# Patient Record
Sex: FEMALE
[De-identification: names, ages, dates, MRNs, and addresses within clinical notes are randomized per-mention and may not be internally consistent; named-entity substitution may affect disease eponyms.]

---

## 2017-10-25 ENCOUNTER — CHARTING TRANS (OUTPATIENT)
Dept: OTHER | Age: 27
End: 2017-10-25

## 2018-11-02 VITALS — TEMPERATURE: 98.2 F

## 2024-03-10 ENCOUNTER — HOSPITAL ENCOUNTER (OUTPATIENT)
Age: 34
Discharge: HOME | End: 2024-03-10
Payer: COMMERCIAL

## 2024-03-10 VITALS
DIASTOLIC BLOOD PRESSURE: 78 MMHG | OXYGEN SATURATION: 99 % | HEART RATE: 98 BPM | BODY MASS INDEX: 25.77 KG/M2 | RESPIRATION RATE: 16 BRPM | WEIGHT: 180 LBS | HEIGHT: 70 IN | TEMPERATURE: 98 F | SYSTOLIC BLOOD PRESSURE: 114 MMHG

## 2024-03-10 DIAGNOSIS — J02.0 STREP PHARYNGITIS: Primary | ICD-10-CM

## 2024-03-10 LAB — S PYO AG THROAT QL: POSITIVE

## 2024-03-10 RX ORDER — AMOXICILLIN 500 MG/1
500 TABLET, FILM COATED ORAL 2 TIMES DAILY
Qty: 20 TABLET | Refills: 0 | Status: SHIPPED | OUTPATIENT
Start: 2024-03-10 | End: 2024-03-20

## 2024-03-10 NOTE — ED INITIAL ASSESSMENT (HPI)
Pt presents to the IC with c/o a sore throat, mainly right sided. +chills and hot flashes but no fevers.

## 2024-03-10 NOTE — ED PROVIDER NOTES
Patient Seen in: [facility]      History     Chief Complaint   Patient presents with    Sore Throat     Body aches and hot and cold flashes. Negative at home COVID test. - Entered by patient     Stated Complaint: Sore Throat - Body aches and hot and cold flashes. Negative at home COVID test.    Subjective:   HPI    33 yr old female here for sore throat, chills, sweats since last night. Denies abd pain, vomiting, diarrhea, ear pain, sob, chest pain, dizziness, headaches. COVID at home was negative. She is breastfeeding her 4 month old. Reports other child with viral symptoms at home.        Objective:   History reviewed. No pertinent past medical history.           History reviewed. No pertinent surgical history.             Social History     Socioeconomic History    Marital status:    Tobacco Use    Smoking status: Never    Smokeless tobacco: Never              Review of Systems    Positive for stated complaint: Sore Throat - Body aches and hot and cold flashes. Negative at home COVID test.  Other systems are as noted in HPI.  Constitutional and vital signs reviewed.      All other systems reviewed and negative except as noted above.    Physical Exam     ED Triage Vitals [03/10/24 1106]   /78   Pulse 98   Resp 16   Temp 98.1 °F (36.7 °C)   Temp src Temporal   SpO2 99 %   O2 Device None (Room air)       Current:/78   Pulse 98   Temp 98.1 °F (36.7 °C) (Temporal)   Resp 16   Ht 177.8 cm (5' 10\")   Wt 81.6 kg   SpO2 99%   Breastfeeding Yes   BMI 25.83 kg/m²         Physical Exam  Vitals and nursing note reviewed.   Constitutional:       General: She is not in acute distress.     Appearance: She is well-developed. She is not ill-appearing or toxic-appearing.   HENT:      Right Ear: Tympanic membrane and ear canal normal.      Left Ear: Tympanic membrane and ear canal normal.      Nose: No congestion or rhinorrhea.      Mouth/Throat:      Mouth: Mucous membranes are moist. No  oral lesions.      Pharynx: Oropharynx is clear. Uvula midline. Posterior oropharyngeal erythema present. No pharyngeal swelling, oropharyngeal exudate or uvula swelling.      Tonsils: No tonsillar exudate or tonsillar abscesses.   Eyes:      Pupils: Pupils are equal, round, and reactive to light.   Cardiovascular:      Rate and Rhythm: Normal rate.   Pulmonary:      Effort: Pulmonary effort is normal. No respiratory distress.      Breath sounds: Normal breath sounds. No stridor. No wheezing, rhonchi or rales.   Musculoskeletal:      Cervical back: Normal range of motion and neck supple.   Lymphadenopathy:      Cervical: No cervical adenopathy.   Skin:     General: Skin is warm.   Neurological:      Mental Status: She is alert and oriented to person, place, and time.   Psychiatric:         Mood and Affect: Mood normal.         Behavior: Behavior normal.               ED Course     Labs Reviewed   POCT RAPID STREP - Abnormal; Notable for the following components:       Result Value    POCT Rapid Strep Positive (*)     All other components within normal limits          ED Course as of 03/10/24 1144  ------------------------------------------------------------  Time: 03/10 1117  Value: POCT Rapid Strep(!)  Comment: (Reviewed)              MDM     33-year-old female here for evaluation of sore throat, chills and sweats since last night    On exam patient well-appearing, lungs are clear with no wheezing stridor or crackles, bilateral TM normal, pharynx with erythema, no swelling or exudate.  Uvula is midline with no PTA.  Tongue is moist.    Differential diagnosis includes strep versus COVID versus other viral syndrome    Strep is positive  Discussed amoxicillin x 10 days.  Patient is breast-feeding, amoxicillin is safe in lactation and pregnancy.  This is the commonly used medication for mastitis.  Discussed side effects including rash, diarrhea and vomiting in baby who is breast-feeding.  These side effects may or may  not happen.  Discussed max milk concentration at 5 hours.    Patient agrees to plan of care she is stable nontoxic for discharge home  All questions answered. Return and ER precautions given.    Counseled: Patient, regarding diagnosis, regarding treatment plan, regarding diagnostic results, regarding prescription, I have discussed with the patient the results of tests, differential diagnosis, and warning signs and symptoms that should prompt immediate return. The patient understands these instructions and agrees to the follow-up plan provided. There is no barriers to learning. Appropriate f/u given. Patient agrees to return for any concerns/ problems/complications.                                     Medical Decision Making      Disposition and Plan     Clinical Impression:  1. Strep pharyngitis         Disposition:  Discharge  3/10/2024 11:29 am    Follow-up:  No follow-up provider specified.        Medications Prescribed:  Discharge Medication List as of 3/10/2024 11:30 AM        START taking these medications    Details   amoxicillin 500 MG Oral Tab Take 1 tablet (500 mg total) by mouth 2 (two) times daily for 10 days., Normal, Disp-20 tablet, R-0

## 2024-03-10 NOTE — DISCHARGE INSTRUCTIONS
Increase water intake, drink water, gatorade, and stick with a soft and liquid diet until throat is feeling better.    Take tylenol or motrin for pain every 6 to 8 hours if needed.    Take antibiotics prescribed, finish full course! Amoxicillin is safe in lactation. This is what is used if you develop mastitis. Side effects of this for baby can possibly be a rash, diarrhea or vomiting. The max milk concentration is around 5 hours after ingesting the medication.    Change toothbrush in 48 hours.  Avoid sharing utensils, cups, foods with others. Avoid kissing.    RETURN OR GO TO ED for fever > 103 despite medication, difficulty swallowing saliva, shortness of breath, worsening swelling to throat that you cannot tolerate fluids.

## 2024-03-29 ENCOUNTER — HOSPITAL ENCOUNTER (OUTPATIENT)
Age: 34
Discharge: HOME | End: 2024-03-29
Payer: COMMERCIAL

## 2024-03-29 VITALS
RESPIRATION RATE: 18 BRPM | DIASTOLIC BLOOD PRESSURE: 69 MMHG | SYSTOLIC BLOOD PRESSURE: 110 MMHG | HEART RATE: 91 BPM | TEMPERATURE: 98 F | OXYGEN SATURATION: 98 %

## 2024-03-29 DIAGNOSIS — J02.0 STREPTOCOCCAL SORE THROAT: Primary | ICD-10-CM

## 2024-03-29 LAB — S PYO AG THROAT QL: POSITIVE

## 2024-03-29 PROCEDURE — 99213 OFFICE O/P EST LOW 20 MIN: CPT

## 2024-03-29 PROCEDURE — 87880 STREP A ASSAY W/OPTIC: CPT

## 2024-03-29 RX ORDER — AMOXICILLIN AND CLAVULANATE POTASSIUM 875; 125 MG/1; MG/1
1 TABLET, FILM COATED ORAL 2 TIMES DAILY
Qty: 20 TABLET | Refills: 0 | Status: SHIPPED | OUTPATIENT
Start: 2024-03-29 | End: 2024-04-08

## 2024-03-29 NOTE — ED PROVIDER NOTES
Patient Seen in: [facility]      History     Chief Complaint   Patient presents with    Sore Throat     Entered by patient     Stated Complaint: Sore Throat    Subjective:   Jelena is a 33-year-old female presenting to the immediate care complaining of a sore throat.  Patient states that her sore throat started last night and was accompanied by some bodyaches, chills and general unwell feeling.  Patient states that she was treated for strep earlier in the month and took all 10 days worth of the antibiotics was feeling much better until last night.  Patient does state that her daughter had strep at the same time as her.  Also states that her  tested positive for strep but was not treated around that same time.  She is unsure of any other known exposures.  Patient has not had a measured fever.  She denies any chest pain, shortness of breath, dizziness, weakness, abdominal pain or vomiting.  She has been eating and drinking well and is well-hydrated.  She states that she has some pain with swallowing, no difficulty swallowing or voice changes.  She denies any other concerns or complaints.          Objective:   History reviewed. No pertinent past medical history.           History reviewed. No pertinent surgical history.             Social History     Socioeconomic History    Marital status:    Tobacco Use    Smoking status: Never     Passive exposure: Never    Smokeless tobacco: Never   Vaping Use    Vaping Use: Never used   Substance and Sexual Activity    Alcohol use: Never    Drug use: Never              Review of Systems    Positive for stated complaint: Sore Throat  Other systems are as noted in HPI.  Constitutional and vital signs reviewed.      All other systems reviewed and negative except as noted above.    Physical Exam     ED Triage Vitals [03/29/24 1207]   /69   Pulse 91   Resp 18   Temp 97.5 °F (36.4 °C)   Temp src Temporal   SpO2 98 %   O2 Device None (Room air)        Current:/69   Pulse 91   Temp 97.5 °F (36.4 °C) (Temporal)   Resp 18   SpO2 98%   Breastfeeding Yes         Physical Exam  Vitals and nursing note reviewed.   Constitutional:       General: She is not in acute distress.     Appearance: Normal appearance. She is not ill-appearing, toxic-appearing or diaphoretic.   HENT:      Head: Normocephalic.      Right Ear: Tympanic membrane, ear canal and external ear normal.      Left Ear: Tympanic membrane, ear canal and external ear normal.      Nose: Nose normal.      Mouth/Throat:      Mouth: Mucous membranes are moist.      Pharynx: Oropharynx is clear. Uvula midline. Posterior oropharyngeal erythema present. No pharyngeal swelling, oropharyngeal exudate or uvula swelling.      Tonsils: Tonsillar exudate present. No tonsillar abscesses. 1+ on the right. 1+ on the left.      Comments: No trismus  Eyes:      Conjunctiva/sclera: Conjunctivae normal.   Cardiovascular:      Rate and Rhythm: Normal rate and regular rhythm.      Pulses: Normal pulses.      Heart sounds: Normal heart sounds.   Pulmonary:      Effort: Pulmonary effort is normal. No respiratory distress.      Breath sounds: Normal breath sounds. No stridor. No wheezing, rhonchi or rales.   Abdominal:      General: Abdomen is flat.   Musculoskeletal:         General: Normal range of motion.      Cervical back: Normal range of motion.   Skin:     General: Skin is warm.      Capillary Refill: Capillary refill takes less than 2 seconds.   Neurological:      General: No focal deficit present.      Mental Status: She is alert and oriented to person, place, and time.   Psychiatric:         Mood and Affect: Mood normal.         Behavior: Behavior normal.         Thought Content: Thought content normal.         Judgment: Judgment normal.               ED Course     Labs Reviewed   POCT RAPID STREP - Abnormal; Notable for the following components:       Result Value    POCT Rapid Strep Positive (*)     All  other components within normal limits              MDM               Medical Decision Making  Multiple medical diagnoses were considered including but not limited to viral versus bacterial etiology of sore throat, less likely peritonsillar abscess.  Patient is well appearing, non-toxic and in no acute distress.  Vital signs are stable.   Strep test was positive.  No unilateral tonsil swelling, no trismus.  Sent a prescription for Augmentin for the strep given recent treatment with amoxicillin earlier in the month.  Recommended that the patient use Tylenol or Motrin for fever or pain, drink plenty of fluids.  Recommended that if the patient develops any difficulty swallowing, voice changes, chest pain, shortness of breath or any other concerning complaints they should go to the emergency department.  Otherwise recommended follow up with PCP.  Recommended replacing the patient's toothbrush after the first 72 hours of antibiotics.  Also recommended the patient not return to school/work until 24 hours of antibiotics are complete.  ED precautions discussed.  Patient advised to follow up with PCP in 2-3 days.  Patient agrees with this plan of care.  Patient verbalizes understanding of discharge instructions and plan of care.      Amount and/or Complexity of Data Reviewed  Labs: ordered. Decision-making details documented in ED Course.    Risk  OTC drugs.  Prescription drug management.        Disposition and Plan     Clinical Impression:  1. Streptococcal sore throat         Disposition:  Discharge  3/29/2024 12:31 pm    Follow-up:  Aleyda Wilkerson MD  [address] [place] [id number]  [phone]                Medications Prescribed:  Current Discharge Medication List        START taking these medications    Details   amoxicillin clavulanate 875-125 MG Oral Tab Take 1 tablet by mouth 2 (two) times daily for 10 days.  Qty: 20 tablet, Refills: 0

## 2024-03-29 NOTE — ED INITIAL ASSESSMENT (HPI)
Patient was here on 3/10 and was diagnosed with strep did the entire 10 day course of abx and then since last night she felt feverish, sore throat, body aches. Patient states that her  was here and tested positive but was never given a rx for strep but because was asymptomatic. Patient states if her  was never treated then she may have been reinfected by him, or her daughter that also had strep.

## 2024-03-29 NOTE — DISCHARGE INSTRUCTIONS
Strep test was positive.  Please take the antibiotics as prescribed.  Use tylenol or motrin for fever or pain, drink plenty of fluids.  If the patient develops any difficulty swallowing, voice changes, chest pain, shortness of breath or any other concerning complaints they should go to the emergency department.  Otherwise follow up with your primary care doctor.  You should replace the patient's  toothbrush after the first 72 hours of antibiotics.  Also do not return to school/work until 24 hours of antibiotics are complete.

## 2024-08-29 ENCOUNTER — HOSPITAL ENCOUNTER (OUTPATIENT)
Age: 34
Discharge: HOME | End: 2024-08-29
Payer: COMMERCIAL

## 2024-08-29 VITALS
HEART RATE: 71 BPM | SYSTOLIC BLOOD PRESSURE: 97 MMHG | DIASTOLIC BLOOD PRESSURE: 71 MMHG | RESPIRATION RATE: 20 BRPM | OXYGEN SATURATION: 100 % | TEMPERATURE: 97 F

## 2024-08-29 DIAGNOSIS — J02.9 VIRAL PHARYNGITIS: Primary | ICD-10-CM

## 2024-08-29 LAB — S PYO AG THROAT QL: NEGATIVE

## 2024-08-29 PROCEDURE — 87880 STREP A ASSAY W/OPTIC: CPT | Performed by: NURSE PRACTITIONER

## 2024-08-29 PROCEDURE — 99213 OFFICE O/P EST LOW 20 MIN: CPT | Performed by: NURSE PRACTITIONER

## 2024-08-29 NOTE — ED PROVIDER NOTES
Chief Complaint   Patient presents with    Sore Throat       HPI:     Jelena Castro is a 33 year old female who presents for evaluation and management of a chief complaint of sore throat and fatigue ongoing for 6 days.  No fever.  No nasal congestion or cough.  No chest pain or shortness of breath.  No nausea, vomiting, diarrhea, or abdominal pain.    PFS  PFS asessment screens reviewed and agree.  Nursing note reviewed and I agree with documentation.    No family history on file.  Family history reviewed with patient/caregiver and is not pertinent to presenting problem.  Social History     Socioeconomic History    Marital status:      Spouse name: Not on file    Number of children: Not on file    Years of education: Not on file    Highest education level: Not on file   Occupational History    Not on file   Tobacco Use    Smoking status: Never     Passive exposure: Never    Smokeless tobacco: Never   Vaping Use    Vaping status: Never Used   Substance and Sexual Activity    Alcohol use: Never    Drug use: Never    Sexual activity: Not on file   Other Topics Concern    Not on file   Social History Narrative    Not on file     Social Determinants of Health     Financial Resource Strain: Not on file   Food Insecurity: No Food Insecurity (11/16/2023)    [facility], [facility]    Food Insecurity     Currently or in the past 3 months, have you worried your food would run out before you had money to buy more?: No     In the past 12 months, have you run out of food or been unable to get more?: No   Transportation Needs: No Transportation Needs (11/16/2023)    [facility], [facility]    Transportation Needs     Currently or in the past 3 months, has lack of transportation kept you from medical appointments, getting food or medicine, or providing care to a family member?: Not on file     : Not on file      Medical Transportation Needs?: No     Daily Living Transportation Needs? [Peds Only] : Not on file   Physical Activity: Not on file   Stress: Not on file   Social Connections: Unknown (3/12/2021)    [facility]    Social Connections     Conversations with friends/family/neighbors per week: Not on file   Housing Stability: Low Risk  (7/17/2021)    [facility]    Housing Stability     Mortgage Payment Concerns?: Not on file     Number of Places Lived in the Last Year: Not on file     Unstable Housing?: Not on file        Findings:    BP 97/71   Pulse 71   Temp 97.4 °F (36.3 °C) (Oral)   Resp 20   SpO2 100%   Breastfeeding Yes   GENERAL: well developed, well nourished, well hydrated, no distress  HEAD: normocephalic  NECK: supple, no adenopathy  EYES: sclera non icteric bilateral, conjunctiva clear  EARS: TM  bilateral: normal  NOSE: nasal turbinates: pink, normal mucosa  THROAT: clear, without exudates  CARDIO: RRR without murmur  LUNGS: clear to auscultation bilaterally; no rales, rhonchi, or wheezes  SKIN: good skin turgor, no obvious rashes    MDM/Assessment/Plan:   Orders for this encounter:  Orders Placed This Encounter    POCT Rapid Strep    POCT Rapid Strep       Labs performed this visit:  Recent Results (from the past 10 hour(s))   POCT Rapid Strep    Collection Time: 08/29/24 11:15 AM   Result Value Ref Range    POCT Rapid Strep Negative Negative       MDM:   Medical Decision Making  Differentials include: Strep pharyngitis, viral pharyngitis, peritonsillar abscess vs other     HPI and exam consistent with viral pharyngitis.  Strep test negative today.  Patient is tolerating p.o., no sign of peritonsillar abscess on exam.  Discussed supportive care including Tylenol, Ibuprofen, fluids, cepacol.  Return precautions were discussed.  Advised follow-up with primary care provider  if no improvement in 1 week. Patient verbalized understanding and agreeable to plan of care.      Amount and/or Complexity of Data Reviewed  Labs: ordered. Decision-making details documented in ED Course.     Details: Strep negative     Risk  OTC drugs.          Diagnosis:    ICD-10-CM    1. Viral pharyngitis  J02.9           All results reviewed and discussed with patient.  See AVS for detailed discharge instructions for your condition today.    Follow Up with:  No follow-up provider specified.